# Patient Record
Sex: MALE | Race: WHITE | NOT HISPANIC OR LATINO | URBAN - METROPOLITAN AREA
[De-identification: names, ages, dates, MRNs, and addresses within clinical notes are randomized per-mention and may not be internally consistent; named-entity substitution may affect disease eponyms.]

---

## 2020-07-22 ENCOUNTER — OFFICE VISIT (OUTPATIENT)
Dept: ENDOCRINOLOGY | Facility: CLINIC | Age: 55
End: 2020-07-22

## 2020-07-22 VITALS
BODY MASS INDEX: 35.98 KG/M2 | HEART RATE: 86 BPM | OXYGEN SATURATION: 98 % | WEIGHT: 257 LBS | DIASTOLIC BLOOD PRESSURE: 62 MMHG | HEIGHT: 71 IN | SYSTOLIC BLOOD PRESSURE: 124 MMHG

## 2020-07-22 DIAGNOSIS — N52.9 ERECTILE DYSFUNCTION, UNSPECIFIED ERECTILE DYSFUNCTION TYPE: ICD-10-CM

## 2020-07-22 DIAGNOSIS — E28.0 ESTROGEN INCREASED: Primary | ICD-10-CM

## 2020-07-22 DIAGNOSIS — E11.65 TYPE 2 DIABETES MELLITUS WITH HYPERGLYCEMIA, WITHOUT LONG-TERM CURRENT USE OF INSULIN (HCC): ICD-10-CM

## 2020-07-22 LAB
ESTRADIOL SERPL HS-MCNC: 16.7 PG/ML
PROLACTIN SERPL-MCNC: 8.7 NG/ML (ref 4.04–15.2)

## 2020-07-22 PROCEDURE — 84146 ASSAY OF PROLACTIN: CPT | Performed by: INTERNAL MEDICINE

## 2020-07-22 PROCEDURE — 82670 ASSAY OF TOTAL ESTRADIOL: CPT | Performed by: INTERNAL MEDICINE

## 2020-07-22 PROCEDURE — 99244 OFF/OP CNSLTJ NEW/EST MOD 40: CPT | Performed by: INTERNAL MEDICINE

## 2020-07-22 RX ORDER — VARDENAFIL HYDROCHLORIDE 20 MG/1
20 TABLET ORAL AS NEEDED
COMMUNITY
End: 2020-12-01

## 2020-07-22 RX ORDER — SUCRALFATE 1 G/1
1 TABLET ORAL 2 TIMES DAILY
COMMUNITY
End: 2020-12-01

## 2020-07-22 RX ORDER — ROSUVASTATIN CALCIUM 10 MG/1
10 TABLET, COATED ORAL DAILY
COMMUNITY
End: 2021-03-10

## 2020-07-22 RX ORDER — ESOMEPRAZOLE MAGNESIUM 20 MG/1
1 TABLET, DELAYED RELEASE ORAL DAILY
COMMUNITY
End: 2020-12-01

## 2020-07-22 NOTE — PROGRESS NOTES
Chief Complaint   Patient presents with   • Establish Care   • Elevated Estrogen   • Decreased Libido        New patient who is being seen in consultation regarding Elevated estrogen, decreased libido  at the request of Jose Freeman MD     HPI   Tiago Whatley is a 55 y.o. male who presents to the office for evaluation of elevated estrogen, decreased libido.    Patient reports that he presented to his PCP for evaluation primarily of erectile dysfunction which had become more prominent over the past 6 months to 1 year.  He reports difficulty obtaining an erection.  He reports intermittent morning erections.  He reports that he was prescribed Levitra for this with good effect and with medication he obtains erection satisfactory for intercourse.  He denies any decrease in libido.  He reports having several labs completed with his PCP, most recently total estrogens noted to be elevated.  Other laboratory evaluation was unremarkable.  Patient denies any recent illness.  His only recent medication change was the addition of Jardiance due to suboptimal diabetes control, most recent A1c 8.2, improved from greater than 10 prior.  Patient denies any nipple discharge or breast pain.  Patient denies any testicular changes or discomfort.    Past Medical History:   Diagnosis Date   • Cellulitis    • Diverticulitis    • Hyperlipidemia    • Pancreatitis    • Skin cancer    • Type 2 diabetes mellitus (CMS/[facility])      Past Surgical History:   Procedure Laterality Date   • CARDIAC CATHETERIZATION     • COLONOSCOPY        Family History   Problem Relation Age of Onset   • Cancer Mother    • Diabetes Mother    • Heart attack Mother    • Hyperlipidemia Mother    • Kidney failure Mother    • Cancer Father       Social History     Socioeconomic History   • Marital status:      Spouse name: Not on file   • Number of children: Not on file   • Years of education: Not on file   • Highest education level: Not on file   Tobacco Use   •  "Smoking status: Never Smoker   • Smokeless tobacco: Never Used   Substance and Sexual Activity   • Alcohol use: Not Currently   • Drug use: Never   • Sexual activity: Defer      No Known Allergies   Current Outpatient Medications on File Prior to Visit   Medication Sig Dispense Refill   • Empagliflozin (Jardiance) 25 MG tablet Take 1 tablet by mouth Daily.     • Ergocalciferol (VITAMIN D2 PO) Take 1 tablet by mouth 1 (One) Time Per Week.     • Esomeprazole Magnesium 20 MG tablet delayed-release Take 1 tablet by mouth Daily.     • metFORMIN (GLUCOPHAGE) 500 MG tablet Take 500 mg by mouth 2 (Two) Times a Day With Meals.     • rosuvastatin (CRESTOR) 10 MG tablet Take 10 mg by mouth Daily.     • SITagliptin (JANUVIA) 100 MG tablet Take 100 mg by mouth Daily.     • sucralfate (CARAFATE) 1 g tablet Take 1 g by mouth 2 (two) times a day.     • vardenafil (LEVITRA) 20 MG tablet Take 20 mg by mouth As Needed for Erectile Dysfunction.       No current facility-administered medications on file prior to visit.         Review of Systems   Constitutional: Positive for fatigue. Negative for unexpected weight gain.   Cardiovascular: Positive for chest pain (none current, recently diagnosed with stomach issues). Negative for palpitations.   Gastrointestinal: Positive for abdominal pain. Negative for constipation and diarrhea.   Endocrine: Positive for polyuria. Negative for cold intolerance and heat intolerance.   Musculoskeletal: Negative for arthralgias and myalgias.   Skin: Negative for dry skin and rash.   Neurological: Negative for tremors and headache.   Psychiatric/Behavioral: Positive for sleep disturbance. The patient is nervous/anxious.         Vitals:    07/22/20 0951   BP: 124/62   Pulse: 86   SpO2: 98%   Weight: 117 kg (257 lb)   Height: 180.3 cm (71\")   Body mass index is 35.84 kg/m².     Physical Exam   Constitutional: Vital signs are normal. He appears well-developed and well-nourished. He is cooperative. He is " obese.  HENT:   Head: Normocephalic and atraumatic.   Right Ear: Hearing normal.   Left Ear: Hearing normal.   Nose: Nose normal.   Eyes: Pupils are equal, round, and reactive to light. Conjunctivae and EOM are normal.   Neck: Trachea normal. No thyromegaly present.   Cardiovascular: Normal rate and regular rhythm.   No murmur heard.  Pulmonary/Chest: Effort normal and breath sounds normal. No respiratory distress.   Abdominal: Soft. Bowel sounds are normal. He exhibits no distension. There is no hepatosplenomegaly. There is no tenderness.   Lymphadenopathy:        Head (right side): No submandibular adenopathy present.        Head (left side): No submandibular adenopathy present.     He has no cervical adenopathy.   Neurological: He is alert. He has normal strength and normal reflexes.   Skin: Skin is warm, dry and intact. No rash noted.   Psychiatric: He has a normal mood and affect. His behavior is normal.   Vitals reviewed.    Labs/Imaging  Labs dated 6/3/2020  Alkaline phosphatase 66, reference range 39-1 17  AST 17, reference range 0-40  ALT 25, reference range 0-44  EGFR 101  Total estrogens 170, reference range   Hemoglobin A1c 8.2%  Triglycerides 233  LDL 37  Urine microalbumin to creatinine ratio 10  TSH 2.61  Free testosterone 10.5, reference range 7.2-24    Assessment and Plan    Tiago was seen today for establish care, elevated estrogen and decreased libido.    Diagnoses and all orders for this visit:    Estrogen increased  - discussed potential etiologies including medications, stress, weight gain, liver dysfunction, thyroid dysfunction, hyperprolactinemia, hypogonadism, acute illness   -Patient any medications which would be expected to raise estrogen, prior TSH, LFTs, testosterone levels normal.  Patient denies recent illness.  -We will plan to repeat estradiol today and obtain prolactin level  -We will determine next steps based on labs  -Discussed with patient most likely etiology of his  elevated estrogen is aromatization of testosterone in peripheral tissues secondary to obesity  -     Estradiol  -     Prolactin    Erectile dysfunction, unspecified erectile dysfunction type  -Patient reports this is adequately managed with Levitra.    Type 2 diabetes mellitus with hyperglycemia, without long-term current use of insulin (CMS/[facility])  -Discussed long-term consequences of uncontrolled diabetes, specifically effects on vasculature and nerves which could be contributing to patient's concerns regarding erectile function.  -Jardiance recently added to patient's medication list, most recent A1c improved, discussed goal A1c 7  -Could consider exchanging Januvia for GLP-1 receptor agonist given weight loss benefits.         Return in about 4 months (around 11/22/2020) for Next scheduled follow up. The patient was instructed to contact the clinic with any interval questions or concerns.    Jo-Ann Perez MD     Please note that portions of this document were completed using a voice recognition program. Efforts were made to edit the dictations, but occasionally words are mis-transcribed.

## 2020-12-01 ENCOUNTER — LAB (OUTPATIENT)
Dept: LAB | Facility: HOSPITAL | Age: 55
End: 2020-12-01

## 2020-12-01 ENCOUNTER — OFFICE VISIT (OUTPATIENT)
Dept: ENDOCRINOLOGY | Facility: CLINIC | Age: 55
End: 2020-12-01

## 2020-12-01 VITALS
HEART RATE: 89 BPM | OXYGEN SATURATION: 99 % | HEIGHT: 71 IN | WEIGHT: 265.8 LBS | BODY MASS INDEX: 37.21 KG/M2 | DIASTOLIC BLOOD PRESSURE: 68 MMHG | SYSTOLIC BLOOD PRESSURE: 144 MMHG

## 2020-12-01 DIAGNOSIS — E11.65 TYPE 2 DIABETES MELLITUS WITH HYPERGLYCEMIA, WITHOUT LONG-TERM CURRENT USE OF INSULIN (HCC): ICD-10-CM

## 2020-12-01 DIAGNOSIS — E28.0 ESTROGEN INCREASED: Primary | ICD-10-CM

## 2020-12-01 DIAGNOSIS — N52.9 ERECTILE DYSFUNCTION, UNSPECIFIED ERECTILE DYSFUNCTION TYPE: ICD-10-CM

## 2020-12-01 LAB
ESTRADIOL SERPL HS-MCNC: 7.1 PG/ML
HBA1C MFR BLD: 6.95 % (ref 4.8–5.6)

## 2020-12-01 PROCEDURE — 82670 ASSAY OF TOTAL ESTRADIOL: CPT | Performed by: INTERNAL MEDICINE

## 2020-12-01 PROCEDURE — 83036 HEMOGLOBIN GLYCOSYLATED A1C: CPT

## 2020-12-01 PROCEDURE — 99213 OFFICE O/P EST LOW 20 MIN: CPT | Performed by: INTERNAL MEDICINE

## 2020-12-01 NOTE — PROGRESS NOTES
"Chief Complaint   Patient presents with   • Follow-up   • Estrogen Increased        HPI   Tiago Whatley is a 55 y.o. male had concerns including Follow-up and Estrogen Increased.      Patient presents for follow-up.  Of note, estradiol level as well as prolactin were normal following last visit.  Patient reports he kept appointment due to concerns for increased breast tissue since last visit.  Of note, he also reports that he has gained weight since his last visit.  Per records, he has gained approximately 8 pounds.  He reports that he remains active in his daily activities but does not have dedicated exercise.  He does report that he has been drinking more soda and has not been mindful of his diet.  He has not had repeat evaluation of his diabetes in the interim since his last visit.  He reports fasting blood sugars generally 140-180 at home.  He does report that he changed his Metformin to taking 2 tablets in the morning secondary to GI upset.  He also continues on Jardiance and Januvia.  Patient reports ongoing erectile dysfunction.  He reports he has not used Levitra recently which is why it was removed from his medication list but he did have success with this medication in the past.    The following portions of the patient's history were reviewed and updated as appropriate: allergies, current medications and past social history.    Review of Systems   Constitutional: Positive for unexpected weight gain.   Respiratory: Negative for cough and shortness of breath.    Cardiovascular: Negative for chest pain and palpitations.   Gastrointestinal: Negative for constipation, diarrhea and nausea.   Genitourinary: Positive for erectile dysfunction.       /68   Pulse 89   Ht 180.3 cm (71\")   Wt 121 kg (265 lb 12.8 oz)   SpO2 99%   BMI 37.07 kg/m²      Physical Exam      Constitutional:  well developed; well nourished  no acute distress  obese - Body mass index is 37.07 kg/m².   ENT/Thyroid: not examined "   Eyes: Conjunctiva: clear   Respiratory:  breathing is unlabored  clear to auscultation bilaterally   Cardiovascular:  regular rate and rhythm   Chest:  Not performed.   Abdomen: soft, non-tender; no masses   : Not performed.   Musculoskeletal: Not performed   Skin: dry and warm   Neuro: mental status, speech normal   Psych: mood and affect are within normal limits       Labs/Imaging  Results for LYNNE NEWBERRY (MRN [id number]) as of 12/1/2020 09:12   Ref. Range 7/22/2020 10:40   Prolactin Latest Ref Range: 4.04 - 15.20 ng/mL 8.70   Estradiol Latest Units: pg/mL 16.7       Diagnoses and all orders for this visit:    1. Estrogen increased (Primary)  -Repeat estradiol level was normal following last visit  -Thyroid function, liver function, testosterone, prolactin were all previously normal  -Patient reports concern for increased volume of breast tissue development and desires repeat testing.  Ordered.  -Discussed possibility of pseudogynecomastia secondary to weight gain.  Also reviewed aromatization of testosterone to estrogen in peripheral tissues given obesity.  -     Estradiol    2. Type 2 diabetes mellitus with hyperglycemia, without long-term current use of insulin (CMS/[facility])  -Most recent available hemoglobin A1c 8.2%  -Patient currently taking Metformin 1000 mg once daily in the morning, Januvia 100 mg, Jardiance 25 mg daily  -Reviewed medications which may potentially aid with improving glycemic control as well as weight loss.  Specifically discussed exchanging Januvia for a GLP-1 receptor agonist.  Patient reports he would prefer to avoid daily injections but would be open to taking it once weekly injection if covered by insurance.  Patient denies any history of pancreatitis, personal or family history of medullary thyroid cancer.  Reviewed potential side effects of nausea, vomiting, diarrhea.  -Repeat hemoglobin A1c today, will likely exchange Januvia for Ozempic or formulary equivalent  -Reviewed role  of dietary changes and exercise in improving glycemic control  -     Hemoglobin A1c; Future    3. Erectile dysfunction, unspecified erectile dysfunction type  -Patient reports this previously responded well to Levitra  -Discussed patient potential role of uncontrolled diabetes in causing/worsening errectile dysfunction, considering medication change to improve glycemic control, as above.      Return in about 3 months (around 3/1/2021). The patient was instructed to contact the clinic with any interval questions or concerns.    Jo-Ann Perez MD   Endocrinologist    Please note that portions of this document were completed with a voice recognition program. Efforts were made to edit the dictations, but occasionally words are mis-transcribed.

## 2021-03-10 ENCOUNTER — OFFICE VISIT (OUTPATIENT)
Dept: ENDOCRINOLOGY | Facility: CLINIC | Age: 56
End: 2021-03-10

## 2021-03-10 ENCOUNTER — LAB (OUTPATIENT)
Dept: LAB | Facility: HOSPITAL | Age: 56
End: 2021-03-10

## 2021-03-10 VITALS
WEIGHT: 264.6 LBS | BODY MASS INDEX: 37.04 KG/M2 | DIASTOLIC BLOOD PRESSURE: 72 MMHG | HEIGHT: 71 IN | HEART RATE: 91 BPM | SYSTOLIC BLOOD PRESSURE: 144 MMHG | OXYGEN SATURATION: 97 %

## 2021-03-10 DIAGNOSIS — E11.65 TYPE 2 DIABETES MELLITUS WITH HYPERGLYCEMIA, WITHOUT LONG-TERM CURRENT USE OF INSULIN (HCC): ICD-10-CM

## 2021-03-10 DIAGNOSIS — N62 PSEUDOGYNECOMASTIA: Primary | ICD-10-CM

## 2021-03-10 DIAGNOSIS — R63.5 WEIGHT GAIN: ICD-10-CM

## 2021-03-10 PROCEDURE — 99213 OFFICE O/P EST LOW 20 MIN: CPT | Performed by: INTERNAL MEDICINE

## 2021-03-10 PROCEDURE — 83036 HEMOGLOBIN GLYCOSYLATED A1C: CPT | Performed by: INTERNAL MEDICINE

## 2021-03-10 PROCEDURE — 84439 ASSAY OF FREE THYROXINE: CPT | Performed by: INTERNAL MEDICINE

## 2021-03-10 PROCEDURE — 84443 ASSAY THYROID STIM HORMONE: CPT | Performed by: INTERNAL MEDICINE

## 2021-03-10 RX ORDER — ASPIRIN 81 MG/1
1 TABLET, COATED ORAL DAILY
COMMUNITY
Start: 2021-02-01

## 2021-03-10 RX ORDER — ROSUVASTATIN CALCIUM 20 MG/1
20 TABLET, COATED ORAL DAILY
COMMUNITY
End: 2022-03-29

## 2021-03-10 NOTE — PROGRESS NOTES
"Chief Complaint   Patient presents with   • Follow-up   • Estrogen Increased        HPI   Tiago Whatley is a 55 y.o. male had concerns including Follow-up and Estrogen Increased.      Patient reports that he developed double vision in the interim since last visit.  He has had extensive evaluation and is awaiting an appointment with neuro-ophthalmology to program the staples.  He denies other significant health changes.    Reviewed labs that were obtained following last.  Repeat estrogen level was normal.  And hemoglobin A1c was at goal.    Patient reports that he believes breast tissue development is unchanged.  He denies any breast tenderness.  He does report a trend of weight gain.  Patient also reports continued erectile dysfunction which is unchanged.  He does use Levitra with good result but reports this medication is expensive.    For diabetes, patient continues on Januvia 100 mg daily, Metformin 1000 mg daily, Jardiance 25 mg daily.  He denies any stomach upset with medications.  No urinary tract or yeast infections.  Patient continues on rosuvastatin for hyperlipidemia, increased during recent hospitalization.    Patient reports concern for thyroid dysfunction.  He has had weight gain, as above.  He also reports that he is concerned this may be affecting his vision.  He denies any prior screening of thyroid function.    The following portions of the patient's history were reviewed and updated as appropriate: allergies, current medications and past social history.    Review of Systems   Constitutional: Positive for unexpected weight gain. Negative for fatigue.   Gastrointestinal: Negative for constipation, diarrhea and vomiting.   Genitourinary: Positive for erectile dysfunction. Negative for breast discharge.       /72   Pulse 91   Ht 180.3 cm (71\")   Wt 120 kg (264 lb 9.6 oz)   SpO2 97%   BMI 36.90 kg/m²      Physical Exam      Constitutional:  well developed; well nourished  no acute " distress  appears stated age  obese - Body mass index is 36.9 kg/m².   ENT/Thyroid: not examined   Eyes: EOM intact  Conjunctiva: clear   Respiratory:  Not performed.  breathing is unlabored   Cardiovascular:  regular rate and rhythm   Chest:  Not performed.   Abdomen: soft, non-tender; no masses   : Not performed.   Musculoskeletal: Not performed   Skin: dry and warm   Neuro: mental status, speech normal   Psych: mood and affect are within normal limits       Labs/Imaging  Results for LYNNE NEWBERRY (MRN [id number]) as of 3/10/2021 12:49   Ref. Range 7/22/2020 10:40 12/1/2020 09:07   Prolactin Latest Ref Range: 4.04 - 15.20 ng/mL 8.70    Hemoglobin A1C Latest Ref Range: 4.80 - 5.60 %  6.95 (H)   Estradiol Latest Units: pg/mL 16.7 7.1       Diagnoses and all orders for this visit:    1. Pseudogynecomastia (Primary)  Patient initially noted to have elevated estrogen level.  Repeat values on 2 occasions have been normal.  thyroid function, liver function, testosterone, prolactin all normal on previous evaluation  -Patient reports this is stable  -Reviewed role of weight gain and worsening pseudogynecomastia.  -Patient will contact the office with any concerning changes    2. Type 2 diabetes mellitus with hyperglycemia, without long-term current use of insulin (CMS/[facility])  -Currently taking Januvia 100 mg daily, Jardiance 25 mg daily and Metformin 1000 mg daily  -Most recent hemoglobin A1c was well controlled, will repeat today  -Previously discussed changing Januvia to GLP-1 receptor agonist given potential weight loss benefit, will defer unless repeat hemoglobin A1c above goal  -     Hemoglobin A1c    3. Weight gain  -We will check thyroid function  -     TSH  -     T4, Free         Return in about 6 months (around 9/10/2021). The patient was instructed to contact the clinic with any interval questions or concerns.    Jo-Ann Perez MD   Endocrinologist    Please note that portions of this document were completed  with a voice recognition program. Efforts were made to edit the dictations, but occasionally words are mis-transcribed.

## 2021-03-11 LAB
HBA1C MFR BLD: 7.19 % (ref 4.8–5.6)
T4 FREE SERPL-MCNC: 1.26 NG/DL (ref 0.93–1.7)
TSH SERPL DL<=0.05 MIU/L-ACNC: 1.6 UIU/ML (ref 0.27–4.2)

## 2021-09-10 ENCOUNTER — OFFICE VISIT (OUTPATIENT)
Dept: ENDOCRINOLOGY | Facility: CLINIC | Age: 56
End: 2021-09-10

## 2021-09-10 VITALS
WEIGHT: 252 LBS | HEART RATE: 84 BPM | BODY MASS INDEX: 35.28 KG/M2 | DIASTOLIC BLOOD PRESSURE: 74 MMHG | HEIGHT: 71 IN | SYSTOLIC BLOOD PRESSURE: 134 MMHG | OXYGEN SATURATION: 97 %

## 2021-09-10 DIAGNOSIS — E11.65 TYPE 2 DIABETES MELLITUS WITH HYPERGLYCEMIA, WITHOUT LONG-TERM CURRENT USE OF INSULIN (HCC): Primary | ICD-10-CM

## 2021-09-10 DIAGNOSIS — N62 PSEUDOGYNECOMASTIA: ICD-10-CM

## 2021-09-10 DIAGNOSIS — E78.5 HYPERLIPIDEMIA, UNSPECIFIED HYPERLIPIDEMIA TYPE: ICD-10-CM

## 2021-09-10 LAB
EXPIRATION DATE: ABNORMAL
GLUCOSE BLDC GLUCOMTR-MCNC: 190 MG/DL (ref 70–130)
Lab: ABNORMAL

## 2021-09-10 PROCEDURE — 99214 OFFICE O/P EST MOD 30 MIN: CPT | Performed by: INTERNAL MEDICINE

## 2021-09-10 PROCEDURE — 82947 ASSAY GLUCOSE BLOOD QUANT: CPT | Performed by: INTERNAL MEDICINE

## 2021-09-10 NOTE — PROGRESS NOTES
"Chief Complaint   Patient presents with   • Diabetes        HPI   Tiago Whatley is a 56 y.o. male had concerns including Diabetes.        Patient denies significant health changes in the interim since last visit.  He reports that he recently saw his PCP and had repeat labs during an acute visit for a tick bite.    Regarding his diabetes, patient is currently taking Januvia 100 mg daily, Jardiance 25 mg daily, Metformin 1000 mg daily.  Patient has lost 12 pounds in the interim since last visit.  He attributes this to increased activity level as he has been working a lot as well as dietary changes.  He denies any issues with his current medications, specifically no urinary tract or yeast infections, no nausea vomiting or GI upset.    Patient continues on rosuvastatin 20 mg daily for hyperlipidemia.  He denies myalgias or abdominal pain.    Patient reports that he has not noticed any changes in breast tissue in the interim since last visit.  No pain or discharge.    The following portions of the patient's history were reviewed and updated as appropriate: allergies, current medications and past social history.    Review of Systems   Gastrointestinal: Negative for abdominal pain, nausea and vomiting.   Endocrine: Negative for polydipsia and polyuria.   Genitourinary: Negative for breast discharge.   Musculoskeletal: Negative for myalgias.        /74 (BP Location: Right arm, Patient Position: Sitting, Cuff Size: Adult)   Pulse 84   Ht 180.3 cm (71\")   Wt 114 kg (252 lb)   SpO2 97%   BMI 35.15 kg/m²      Physical Exam  Cardiovascular:      Pulses:           Dorsalis pedis pulses are 2+ on the right side and 2+ on the left side.        Posterior tibial pulses are 2+ on the right side and 2+ on the left side.   Feet:      Right foot:      Protective Sensation: 10 sites tested. 10 sites sensed.      Skin integrity: Skin integrity normal.      Left foot:      Protective Sensation: 10 sites tested. 10 sites sensed. "      Skin integrity: Skin integrity normal.           Constitutional:  well developed; well nourished  no acute distress  obese - Body mass index is 35.15 kg/m².   ENT/Thyroid: not examined   Eyes: Conjunctiva: clear   Respiratory:  breathing is unlabored  clear to auscultation bilaterally   Cardiovascular:  regular rate and rhythm   Chest:  Not performed.   Abdomen: soft, non-tender; no masses   : Not performed.   Musculoskeletal: Not performed   Skin: dry and warm   Neuro: mental status, speech normal   Psych: mood and affect are within normal limits     Diabetic Foot Exam Performed and Monofilament Test Performed    Labs/Imaging  Labs dated 8/5/2021  Hemoglobin A1c 7%  eGFR 106  AST 26  ALT 28  Alkaline phosphatase 79  TSH 2.12  Triglycerides 267  LDL 41  Results for LYNNE NEWBERRY (MRN [id number]) as of 9/10/2021 11:25   Ref. Range 9/10/2021 09:27   Glucose Latest Ref Range: 70 - 130 mg/dL 190 (A)       Diagnoses and all orders for this visit:    1. Type 2 diabetes mellitus with hyperglycemia, without long-term current use of insulin (CMS/[facility]) (Primary)  -Uncontrolled based on recent hemoglobin A1c 7% from August 2021  -No home glycemic data available for review  -Patient denies any side effects with current medication  -Continue Januvia 100 mg  -Continue Metformin 1000 mg day  -Continue Jardiance 25 mg daily  -Reviewed the role of weight loss and dietary changes in improving serum glucose  Patient was advised to monitor blood sugar a few times per week.  Patient was instructed to bring glucometer to all future appointments. Patient should contact the clinic between appointments with hypoglycemia or persistent hyperglycemia.  Discussed signs and symptoms of hypoglycemia as well as hypoglycemia management via the rule of 15's.  Discussed potential for long-term complications with uncontrolled diabetes including nephropathy, neuropathy, retinopathy, increased risk for cardiac disease.  Discussed the role of  diet and exercise in the management of diabetes.  Monofilament exam completed today  CMP up-to-date from August 2021, EGFR 106, LFTs normal  Lipid panel up-to-date from August 2021, triglycerides 267, LDL 41, taking rosuvastatin  -     POC Glucose, Blood    2. Hyperlipidemia, unspecified hyperlipidemia type  -LDL at goal, triglycerides mildly elevated on labs from August.  Of note, these were not fasting.  -Continue rosuvastatin 20 mg daily     3. Pseudogynecomastia  Patient initially noted to have elevated estrogen level.  Repeat values on 2 occasions have been normal. thyroid function, liver function, testosterone, prolactin all normal on previous evaluation  -Patient reports no changes in the interim since last visit  -Reviewed that gynecomastia may improve with weight loss       Return in about 6 months (around 3/10/2022). The patient was instructed to contact the clinic with any interval questions or concerns.    Jo-Ann Perez MD   Endocrinologist    Please note that portions of this document were completed with a voice recognition program. Efforts were made to edit the dictations, but occasionally words are mis-transcribed.

## 2022-03-29 ENCOUNTER — TELEPHONE (OUTPATIENT)
Dept: ENDOCRINOLOGY | Facility: CLINIC | Age: 57
End: 2022-03-29

## 2022-03-29 ENCOUNTER — OFFICE VISIT (OUTPATIENT)
Dept: ENDOCRINOLOGY | Facility: CLINIC | Age: 57
End: 2022-03-29

## 2022-03-29 VITALS
DIASTOLIC BLOOD PRESSURE: 68 MMHG | HEART RATE: 94 BPM | SYSTOLIC BLOOD PRESSURE: 126 MMHG | BODY MASS INDEX: 37.52 KG/M2 | WEIGHT: 268 LBS | OXYGEN SATURATION: 96 % | HEIGHT: 71 IN

## 2022-03-29 DIAGNOSIS — E78.5 HYPERLIPIDEMIA, UNSPECIFIED HYPERLIPIDEMIA TYPE: ICD-10-CM

## 2022-03-29 DIAGNOSIS — E11.65 TYPE 2 DIABETES MELLITUS WITH HYPERGLYCEMIA, WITHOUT LONG-TERM CURRENT USE OF INSULIN: Primary | ICD-10-CM

## 2022-03-29 LAB
EXPIRATION DATE: ABNORMAL
EXPIRATION DATE: NORMAL
GLUCOSE BLDC GLUCOMTR-MCNC: 206 MG/DL (ref 70–130)
HBA1C MFR BLD: 6.7 %
Lab: ABNORMAL
Lab: NORMAL

## 2022-03-29 PROCEDURE — 83036 HEMOGLOBIN GLYCOSYLATED A1C: CPT | Performed by: INTERNAL MEDICINE

## 2022-03-29 PROCEDURE — 3044F HG A1C LEVEL LT 7.0%: CPT | Performed by: INTERNAL MEDICINE

## 2022-03-29 PROCEDURE — 99213 OFFICE O/P EST LOW 20 MIN: CPT | Performed by: INTERNAL MEDICINE

## 2022-03-29 PROCEDURE — 82947 ASSAY GLUCOSE BLOOD QUANT: CPT | Performed by: INTERNAL MEDICINE

## 2022-03-29 RX ORDER — ATORVASTATIN CALCIUM 10 MG/1
10 TABLET, FILM COATED ORAL DAILY
COMMUNITY
Start: 2022-02-17

## 2022-03-29 RX ORDER — TESTOSTERONE CYPIONATE 200 MG/ML
400 INJECTION, SOLUTION INTRAMUSCULAR
COMMUNITY
Start: 2022-03-23

## 2022-03-29 NOTE — TELEPHONE ENCOUNTER
----- Message from Jo-Ann Perez MD sent at 3/29/2022  9:50 AM EDT -----  Request recent screening labs, specifically urine microalbumin, renal function testing, lipid panel from PCP

## 2022-03-29 NOTE — PROGRESS NOTES
"Chief Complaint   Patient presents with   • Diabetes   • Hyperlipidemia        HPI   Tiago Whatley is a 56 y.o. male had concerns including Diabetes and Hyperlipidemia.      Patient reports that PCP changed his rosuvastatin to atorvastatin in the interim since last visit due to concern for muscle cramping.  He reports this is improved after transition to atorvastatin.  He also reports that he saw a urologist in [place] and was started on testosterone since last visit.  He reports energy has improved since starting this.    Current diabetes medications include the following: Januvia 100 mg daily, Jardiance 25 mg daily, Metformin 1000 mg daily.  Patient denies any GI upset with medications, no urinary tract or yeast infections.  He is monitoring blood glucose rarely, denies increased thirst or increased urination.    The following portions of the patient's history were reviewed and updated as appropriate: allergies, current medications and past social history.    Review of Systems   Gastrointestinal: Negative for abdominal pain, nausea and vomiting.   Endocrine: Negative for polydipsia and polyuria.   Musculoskeletal: Negative for myalgias.        /68 (BP Location: Left arm, Patient Position: Sitting, Cuff Size: Adult)   Pulse 94   Ht 180.3 cm (71\")   Wt 122 kg (268 lb)   SpO2 96%   BMI 37.38 kg/m²      Physical Exam      Constitutional:  well developed; well nourished  no acute distress  obese - Body mass index is 37.38 kg/m².   ENT/Thyroid: not examined   Eyes: Conjunctiva: clear   Respiratory:  breathing is unlabored  clear to auscultation bilaterally   Cardiovascular:  regular rate and rhythm   Chest:  Not performed.   Abdomen: soft, non-tender; no masses   : Not performed.   Musculoskeletal: Not performed   Skin: not performed.   Neuro: mental status, speech normal   Psych: mood and affect are within normal limits       Labs/Imaging   Latest Reference Range & Units 03/29/22 09:31 03/29/22 09:32 "   Glucose 70 - 130 mg/dL 206 !    Hemoglobin A1C %  6.7   !: Data is abnormal    Diagnoses and all orders for this visit:    1. Type 2 diabetes mellitus with hyperglycemia, without long-term current use of insulin (HCC) (Primary)  Well-controlled with A1c 6.7%, patient denies any notable adverse effects of medication.  Hyperglycemic during office visit.  We did discuss interval weight gain since last visit, discussed correlation between insulin resistance and weight gain.  Reviewed that insulin resistance makes diabetes more difficult to control.  Continue Jardiance 25 mg daily, Metformin 1000 mg daily (this is patient's maximum tolerated dose of this medication) and Januvia 100 mg daily  Patient was advised to monitor blood sugar daily.  Patient was instructed to bring glucometer to all future appointments. Patient should contact the clinic between appointments with hypoglycemia or persistent hyperglycemia.  Discussed signs and symptoms of hypoglycemia as well as hypoglycemia management via the rule of 15's.  Discussed potential for long-term complications with uncontrolled diabetes including nephropathy, neuropathy, retinopathy, increased risk for cardiac disease.  Discussed the role of diet and exercise in the management of diabetes.  Monofilament exam completed September 2021  CMP, lipids up-to-date from August 2021  Eye exam up to date from 2021 per patient report  Patient reports urine microalbumin recently completed by PCP, requesting labs  -     POC Glucose, Blood  -     POC Glycosylated Hemoglobin (Hb A1C)    2. Hyperlipidemia, unspecified hyperlipidemia type  Lipid panel up-to-date from August 2021, continue atorvastatin 10 mg daily.  Patient reports that he developed myalgias while taking rosuvastatin.  Denies current symptoms.    Addendum  Received copy of outside labs dated 2/16/2022    Calcium 9.5  Alkaline phosphatase 98  ALT 23  AST 24  Hemoglobin A1c 7  Triglycerides 342  LDL 46  Microalbumin  to creatinine ratio 20.2  TSH 2.24      Return in about 6 months (around 9/29/2022) for Next scheduled follow up. The patient was instructed to contact the clinic with any interval questions or concerns.    Jo-Ann Perez MD   Endocrinologist    Dictated Utilizing Dragon Dictation

## 2022-09-20 ENCOUNTER — OFFICE VISIT (OUTPATIENT)
Dept: ENDOCRINOLOGY | Facility: CLINIC | Age: 57
End: 2022-09-20

## 2022-09-20 VITALS
DIASTOLIC BLOOD PRESSURE: 92 MMHG | SYSTOLIC BLOOD PRESSURE: 164 MMHG | BODY MASS INDEX: 35.98 KG/M2 | WEIGHT: 257 LBS | HEIGHT: 71 IN | OXYGEN SATURATION: 95 % | HEART RATE: 80 BPM

## 2022-09-20 DIAGNOSIS — E78.5 HYPERLIPIDEMIA, UNSPECIFIED HYPERLIPIDEMIA TYPE: ICD-10-CM

## 2022-09-20 DIAGNOSIS — E11.65 TYPE 2 DIABETES MELLITUS WITH HYPERGLYCEMIA, WITHOUT LONG-TERM CURRENT USE OF INSULIN: Primary | ICD-10-CM

## 2022-09-20 LAB
EXPIRATION DATE: ABNORMAL
EXPIRATION DATE: NORMAL
GLUCOSE BLDC GLUCOMTR-MCNC: 154 MG/DL (ref 70–130)
HBA1C MFR BLD: 6.2 %
Lab: ABNORMAL
Lab: NORMAL

## 2022-09-20 PROCEDURE — 83036 HEMOGLOBIN GLYCOSYLATED A1C: CPT | Performed by: INTERNAL MEDICINE

## 2022-09-20 PROCEDURE — 3044F HG A1C LEVEL LT 7.0%: CPT | Performed by: INTERNAL MEDICINE

## 2022-09-20 PROCEDURE — 99214 OFFICE O/P EST MOD 30 MIN: CPT | Performed by: INTERNAL MEDICINE

## 2022-09-20 PROCEDURE — 82947 ASSAY GLUCOSE BLOOD QUANT: CPT | Performed by: INTERNAL MEDICINE

## 2022-09-20 RX ORDER — DICLOFENAC SODIUM 75 MG/1
TABLET, DELAYED RELEASE ORAL
COMMUNITY
Start: 2022-08-18

## 2022-09-20 NOTE — PROGRESS NOTES
"Chief Complaint   Patient presents with   • Diabetes        HPI   Tiago Whatley is a 57 y.o. male had concerns including Diabetes.      Patient reports no significant health changes in the interim since his last visit.  He has lost approximately 11 pounds.  He reports that he feels well.  He has not been monitoring glucose routinely at home.    Current diabetes medications include the following:  Jardiance 25 mg daily, metformin 1000 mg daily, Januvia 100 mg daily     Patient continues on atorvastatin 10 mg daily.  He reports intermittent arthralgias, no myalgias.    The following portions of the patient's history were reviewed and updated as appropriate: allergies, current medications and past social history.    Review of Systems   Constitutional: Negative for unexpected weight gain and unexpected weight loss.   Gastrointestinal: Negative for constipation and diarrhea.   Musculoskeletal: Positive for arthralgias. Negative for myalgias.      /92 (BP Location: Left arm, Patient Position: Sitting, Cuff Size: Adult)   Pulse 80   Ht 180.3 cm (71\")   Wt 117 kg (257 lb)   SpO2 95%   BMI 35.84 kg/m²      Physical Exam      Constitutional:  well developed; well nourished  no acute distress  obese - Body mass index is 35.84 kg/m².   ENT/Thyroid: not examined   Eyes: Conjunctiva: clear   Respiratory:  breathing is unlabored  clear to auscultation bilaterally   Cardiovascular:  regular rate and rhythm   Chest:  Not performed.   Abdomen: soft, non-tender; no masses   : Not performed.   Musculoskeletal: Not performed   Skin: not performed.   Neuro: mental status, speech normal   Psych: mood and affect are within normal limits       Labs/Imaging   Latest Reference Range & Units 09/20/22 08:11   Glucose 70 - 130 mg/dL 154 !   Hemoglobin A1C % 6.2   !: Data is abnormal    Diagnoses and all orders for this visit:    1. Type 2 diabetes mellitus with hyperglycemia, without long-term current use of insulin (HCC) " (Primary)  Well-controlled with hemoglobin A1c 6.2%  Patient reports he would like to reduce medications, if able.  Discussed threshold for reduction in medications.  If repeat hemoglobin A1c next visit less than 6.5 we will likely attempt to reduce either metformin or Januvia.  Patient to continue Januvia 100 mg daily, Jardiance 25 mg daily, metformin 1000 mg daily (this is his maximum tolerated dose)  Patient was advised to monitor blood sugar daily.  Patient was instructed to bring glucometer to all future appointments. Patient should contact the clinic between appointments with hypoglycemia or persistent hyperglycemia.  Discussed signs and symptoms of hypoglycemia as well as hypoglycemia management via the rule of 15's.  Discussed potential for long-term complications with uncontrolled diabetes including nephropathy, neuropathy, retinopathy, increased risk for cardiac disease.  Discussed the role of diet and exercise in the management of diabetes.  CMP is up-to-date from February 2022, EGFR 123  Lipid panel up-to-date from February 2022, LDL 46, triglycerides 342  Urine microalbumin to creatinine ratio 20.2 in February 2022  -     POC Glucose, Blood  -     POC Glycosylated Hemoglobin (Hb A1C)    2. Hyperlipidemia, unspecified hyperlipidemia type  Lipid panel is up-to-date from February 2022, continue atorvastatin 10 mg daily      Return in about 4 months (around 1/20/2023). The patient was instructed to contact the clinic with any interval questions or concerns.    Jo-Ann Perez MD   Endocrinologist    Dictated Utilizing Dragon Dictation

## 2023-02-08 ENCOUNTER — OFFICE VISIT (OUTPATIENT)
Dept: ENDOCRINOLOGY | Facility: CLINIC | Age: 58
End: 2023-02-08
Payer: MEDICAID

## 2023-02-08 VITALS
DIASTOLIC BLOOD PRESSURE: 82 MMHG | HEART RATE: 90 BPM | BODY MASS INDEX: 36.82 KG/M2 | SYSTOLIC BLOOD PRESSURE: 144 MMHG | OXYGEN SATURATION: 96 % | WEIGHT: 263 LBS | HEIGHT: 71 IN

## 2023-02-08 DIAGNOSIS — E78.5 HYPERLIPIDEMIA, UNSPECIFIED HYPERLIPIDEMIA TYPE: ICD-10-CM

## 2023-02-08 DIAGNOSIS — E11.65 TYPE 2 DIABETES MELLITUS WITH HYPERGLYCEMIA, WITHOUT LONG-TERM CURRENT USE OF INSULIN: Primary | ICD-10-CM

## 2023-02-08 LAB
EXPIRATION DATE: ABNORMAL
EXPIRATION DATE: NORMAL
GLUCOSE BLDC GLUCOMTR-MCNC: 210 MG/DL (ref 70–130)
HBA1C MFR BLD: 8.3 %
Lab: ABNORMAL
Lab: NORMAL

## 2023-02-08 PROCEDURE — 3052F HG A1C>EQUAL 8.0%<EQUAL 9.0%: CPT | Performed by: INTERNAL MEDICINE

## 2023-02-08 PROCEDURE — 99214 OFFICE O/P EST MOD 30 MIN: CPT | Performed by: INTERNAL MEDICINE

## 2023-02-08 PROCEDURE — 83036 HEMOGLOBIN GLYCOSYLATED A1C: CPT | Performed by: INTERNAL MEDICINE

## 2023-02-08 PROCEDURE — 82947 ASSAY GLUCOSE BLOOD QUANT: CPT | Performed by: INTERNAL MEDICINE

## 2023-02-08 RX ORDER — LANCETS 30 GAUGE
EACH MISCELLANEOUS
Qty: 100 EACH | Refills: 11 | Status: SHIPPED | OUTPATIENT
Start: 2023-02-08

## 2023-02-08 RX ORDER — BLOOD-GLUCOSE METER
1 KIT MISCELLANEOUS ONCE
Qty: 1 EACH | Refills: 0 | Status: SHIPPED | OUTPATIENT
Start: 2023-02-08 | End: 2023-02-08

## 2023-02-08 NOTE — PROGRESS NOTES
"Chief Complaint   Patient presents with   • Diabetes        HPI   Tiago Whatley is a 57 y.o. male had concerns including Diabetes.      Patient reports he was diagnosed with COVID last week.  He reports he has been physically inactive since diagnosis.  Does report that he is now feeling better.  He is not monitoring glucose values at home.  Patient does admit that he has been drinking more Pepsi than previous.    Current diabetes medications with the following:  Januvia 100 mg daily  Jardiance 25 mg daily  Metformin 1000 mg daily    Patient continues on atorvastatin 10 mg daily.  He denies myalgias.    Patient reports he is due to establish care with a new PCP.  He believes he will have labs at this appointment and can bring a copy to next visit.    The following portions of the patient's history were reviewed and updated as appropriate: allergies, current medications and past social history.    Review of Systems   Constitutional: Positive for fatigue.   Gastrointestinal: Negative for abdominal pain, nausea and vomiting.   Endocrine: Negative for polydipsia and polyuria.      /82 (BP Location: Left arm, Patient Position: Sitting, Cuff Size: Adult)   Pulse 90   Ht 180.3 cm (71\")   Wt 119 kg (263 lb)   SpO2 96%   BMI 36.68 kg/m²      Physical Exam      Constitutional:  well developed; well nourished  no acute distress  obese - Body mass index is 36.68 kg/m².   ENT/Thyroid: not examined   Eyes: Conjunctiva: clear   Respiratory:  breathing is unlabored  clear to auscultation bilaterally   Cardiovascular:  regular rate and rhythm   Chest:  Not performed.   Abdomen: Not performed.   : Not performed.   Musculoskeletal: Not performed   Skin: not performed.   Neuro: mental status, speech normal   Psych: mood and affect are within normal limits       Labs/Imaging   Latest Reference Range & Units 02/08/23 13:23   Glucose 70 - 130 mg/dL 210 !   Hemoglobin A1C % 8.3   !: Data is abnormal    Diagnoses and all " orders for this visit:    1. Type 2 diabetes mellitus with hyperglycemia, without long-term current use of insulin (HCC) (Primary)  -     POC Glucose, Blood  -     POC Glycosylated Hemoglobin (Hb A1C)  -     Comprehensive Metabolic Panel; Future  -     Lipid Panel; Future  -     Microalbumin / Creatinine Urine Ratio - Urine, Clean Catch; Future  Uncontrolled with hemoglobin A1c 8.3%, worsened from previous  No home glycemic data available for review  Reviewed options for improving glycemic control.  Patient does report that he previously did not tolerate 2000 mg of metformin daily but would be willing to try 3 tablets daily.  He would prefer not to make a change to GLP-1 receptor agonist or insulin at this time.  Increase metformin to 1000 mg in the morning and 500 mg with dinner  Continue Jardiance 25 mg daily  Continue Januvia 100 mg daily  Patient does report more intake of sugary drinks compared to prior.  Discussed that reduction of sugary drinks would be beneficial in achieving optimal glycemic control.  Patient will work to reduce Pepsi intake.  Patient was advised to monitor blood sugar 1-2 times daily.  Patient was instructed to bring glucometer to all future appointments. Patient should contact the clinic between appointments with hypoglycemia or persistent hyperglycemia.  Discussed signs and symptoms of hypoglycemia as well as hypoglycemia management via the rule of 15's.  Discussed potential for long-term complications with uncontrolled diabetes including nephropathy, neuropathy, retinopathy, increased risk for cardiac disease.  Discussed the role of diet and exercise in the management of diabetes.  Update screening labs as needed after PCP appointment, patient to bring copy to next visit    2. Hyperlipidemia, unspecified hyperlipidemia type  Due for lipid panel, continue atorvastatin    Other orders  -     glucose monitor monitoring kit; 1 each 1 (One) Time for 1 dose.  Dispense: 1 each; Refill: 0  -      glucose blood test strip; Use as instructed 3 times a day  Dispense: 100 each; Refill: 11  -     Lancets misc; For use with glucose monitoring TID  Dispense: 100 each; Refill: 11      Return in about 3 months (around 5/8/2023) for Next scheduled follow up. The patient was instructed to contact the clinic with any interval questions or concerns.    Jo-Ann Perez MD   Endocrinologist    Dictated Utilizing Dragon Dictation

## 2023-05-16 ENCOUNTER — OFFICE VISIT (OUTPATIENT)
Dept: ENDOCRINOLOGY | Facility: CLINIC | Age: 58
End: 2023-05-16
Payer: MEDICAID

## 2023-05-16 VITALS
SYSTOLIC BLOOD PRESSURE: 140 MMHG | HEART RATE: 90 BPM | OXYGEN SATURATION: 94 % | HEIGHT: 71 IN | BODY MASS INDEX: 35.28 KG/M2 | WEIGHT: 252 LBS | DIASTOLIC BLOOD PRESSURE: 90 MMHG

## 2023-05-16 DIAGNOSIS — E11.65 TYPE 2 DIABETES MELLITUS WITH HYPERGLYCEMIA, WITHOUT LONG-TERM CURRENT USE OF INSULIN: Primary | ICD-10-CM

## 2023-05-16 DIAGNOSIS — E78.5 HYPERLIPIDEMIA, UNSPECIFIED HYPERLIPIDEMIA TYPE: ICD-10-CM

## 2023-05-16 DIAGNOSIS — E34.9 TESTOSTERONE DEFICIENCY: ICD-10-CM

## 2023-05-16 LAB
ALBUMIN SERPL-MCNC: 4.4 G/DL (ref 3.5–5.2)
ALBUMIN UR-MCNC: 1.4 MG/DL
ALBUMIN/GLOB SERPL: 2 G/DL
ALP SERPL-CCNC: 68 U/L (ref 39–117)
ALT SERPL W P-5'-P-CCNC: 30 U/L (ref 1–41)
ANION GAP SERPL CALCULATED.3IONS-SCNC: 9.6 MMOL/L (ref 5–15)
AST SERPL-CCNC: 20 U/L (ref 1–40)
BILIRUB SERPL-MCNC: 0.4 MG/DL (ref 0–1.2)
BUN SERPL-MCNC: 15 MG/DL (ref 6–20)
BUN/CREAT SERPL: 18.3 (ref 7–25)
CALCIUM SPEC-SCNC: 9.7 MG/DL (ref 8.6–10.5)
CHLORIDE SERPL-SCNC: 102 MMOL/L (ref 98–107)
CHOLEST SERPL-MCNC: 162 MG/DL (ref 0–200)
CO2 SERPL-SCNC: 25.4 MMOL/L (ref 22–29)
CREAT SERPL-MCNC: 0.82 MG/DL (ref 0.76–1.27)
CREAT UR-MCNC: 79.9 MG/DL
EGFRCR SERPLBLD CKD-EPI 2021: 101.2 ML/MIN/1.73
EXPIRATION DATE: ABNORMAL
EXPIRATION DATE: NORMAL
GLOBULIN UR ELPH-MCNC: 2.2 GM/DL
GLUCOSE BLDC GLUCOMTR-MCNC: 145 MG/DL (ref 70–130)
GLUCOSE SERPL-MCNC: 138 MG/DL (ref 65–99)
HBA1C MFR BLD: 7.8 %
HDLC SERPL-MCNC: 34 MG/DL (ref 40–60)
LDLC SERPL CALC-MCNC: 98 MG/DL (ref 0–100)
LDLC/HDLC SERPL: 2.76 {RATIO}
Lab: ABNORMAL
Lab: NORMAL
MICROALBUMIN/CREAT UR: 17.5 MG/G
POTASSIUM SERPL-SCNC: 4.1 MMOL/L (ref 3.5–5.2)
PROT SERPL-MCNC: 6.6 G/DL (ref 6–8.5)
SODIUM SERPL-SCNC: 137 MMOL/L (ref 136–145)
TRIGL SERPL-MCNC: 171 MG/DL (ref 0–150)
VLDLC SERPL-MCNC: 30 MG/DL (ref 5–40)

## 2023-05-16 PROCEDURE — 99214 OFFICE O/P EST MOD 30 MIN: CPT | Performed by: INTERNAL MEDICINE

## 2023-05-16 PROCEDURE — 84403 ASSAY OF TOTAL TESTOSTERONE: CPT | Performed by: INTERNAL MEDICINE

## 2023-05-16 PROCEDURE — 1160F RVW MEDS BY RX/DR IN RCRD: CPT | Performed by: INTERNAL MEDICINE

## 2023-05-16 PROCEDURE — 82043 UR ALBUMIN QUANTITATIVE: CPT | Performed by: INTERNAL MEDICINE

## 2023-05-16 PROCEDURE — 83036 HEMOGLOBIN GLYCOSYLATED A1C: CPT | Performed by: INTERNAL MEDICINE

## 2023-05-16 PROCEDURE — 84402 ASSAY OF FREE TESTOSTERONE: CPT | Performed by: INTERNAL MEDICINE

## 2023-05-16 PROCEDURE — 80053 COMPREHEN METABOLIC PANEL: CPT | Performed by: INTERNAL MEDICINE

## 2023-05-16 PROCEDURE — 1159F MED LIST DOCD IN RCRD: CPT | Performed by: INTERNAL MEDICINE

## 2023-05-16 PROCEDURE — 80061 LIPID PANEL: CPT | Performed by: INTERNAL MEDICINE

## 2023-05-16 PROCEDURE — 3051F HG A1C>EQUAL 7.0%<8.0%: CPT | Performed by: INTERNAL MEDICINE

## 2023-05-16 PROCEDURE — 82570 ASSAY OF URINE CREATININE: CPT | Performed by: INTERNAL MEDICINE

## 2023-05-16 PROCEDURE — 82947 ASSAY GLUCOSE BLOOD QUANT: CPT | Performed by: INTERNAL MEDICINE

## 2023-05-16 RX ORDER — CHLORAL HYDRATE 500 MG
CAPSULE ORAL
COMMUNITY

## 2023-05-16 RX ORDER — ALOGLIPTIN AND METFORMIN HYDROCHLORIDE 12.5; 1 MG/1; MG/1
TABLET, FILM COATED ORAL 2 TIMES DAILY
COMMUNITY

## 2023-05-16 NOTE — PROGRESS NOTES
"Chief Complaint   Patient presents with   • Diabetes        HPI   Tiago Whatley is a 57 y.o. male had concerns including Diabetes.     Metformin and Januvia transition to combination alogliptin-metformin 12. 5-1000, 2 tablets daily in the interim since last visit. He estimates this was done in February.  Patient does continue on Jardiance 25 mg daily. Patient does report that he had a steroid injection since last visit.    Patient continues on atorvastatin 10 mg daily, denies myalgias.      Patient continues on testosterone therapy which was prescribed by another provider, he request levels be checked today.    The following portions of the patient's history were reviewed and updated as appropriate: allergies, current medications and past social history.    Review of Systems   Gastrointestinal: Negative for abdominal pain, nausea and vomiting.   Musculoskeletal: Negative for myalgias.      /90 (BP Location: Right arm, Patient Position: Sitting, Cuff Size: Adult)   Pulse 90   Ht 180.3 cm (71\")   Wt 114 kg (252 lb)   SpO2 94%   BMI 35.15 kg/m²      Physical Exam      Constitutional:  well developed; well nourished  no acute distress   ENT/Thyroid: not examined   Eyes: Conjunctiva: clear   Respiratory:  clear to auscultation bilaterally   Cardiovascular:  regular rate and rhythm   Chest:  Not performed.   Abdomen: soft, non-tender; no masses   : Not performed.   Musculoskeletal: Not performed   Skin: not performed.   Neuro: mental status, speech normal   Psych: mood and affect are within normal limits       Labs/Imaging   Latest Reference Range & Units 05/16/23 08:29 05/16/23 08:30   Glucose 70 - 130 mg/dL 145 !    Hemoglobin A1C %  7.8   !: Data is abnormal    Diagnoses and all orders for this visit:    1. Type 2 diabetes mellitus with hyperglycemia, without long-term current use of insulin (Primary)  Diabetes is uncontrolled with hemoglobin A1c 7.8%, improved from previous  Patient is currently taking " Jardiance 25 mg daily, will continue  Patient to continue combination alogliptin-metformin 12 point 5-1000, 2 tablets daily.  Reviewed options for improving glycemic control. No medication changes today.  Discussed role of diet changes, exercise and improving glycemic control.  Patient was advised to monitor blood sugar 2-3 times daily.  Patient was instructed to bring glucometer to all future appointments. Patient should contact the clinic between appointments with hypoglycemia or persistent hyperglycemia.  Discussed signs and symptoms of hypoglycemia as well as hypoglycemia management via the rule of 15's.  Discussed potential for long-term complications with uncontrolled diabetes including nephropathy, neuropathy, retinopathy, increased risk for cardiac disease.  Discussed the role of diet and exercise in the management of diabetes.  Update screening labs today  -     POC Glucose, Blood  -     POC Glycosylated Hemoglobin (Hb A1C)    2. Hyperlipidemia, unspecified hyperlipidemia type  Updating lipid panel today, patient continues on atorvastatin.    Return in about 3 months (around 8/16/2023). The patient was instructed to contact the clinic with any interval questions or concerns.    Jo-Ann Perez MD   Endocrinologist    Dictated Utilizing Dragon Dictation

## 2023-05-22 LAB
TESTOST FREE MFR SERPL: 3.06 % (ref 1.5–4.2)
TESTOST FREE SERPL-MCNC: 21.7 NG/DL (ref 5–21)
TESTOST SERPL-MCNC: 709.3 NG/DL (ref 264–916)

## 2023-10-11 ENCOUNTER — OFFICE VISIT (OUTPATIENT)
Dept: ENDOCRINOLOGY | Facility: CLINIC | Age: 58
End: 2023-10-11
Payer: MEDICAID

## 2023-10-11 VITALS
HEART RATE: 96 BPM | BODY MASS INDEX: 36.12 KG/M2 | DIASTOLIC BLOOD PRESSURE: 90 MMHG | OXYGEN SATURATION: 98 % | HEIGHT: 71 IN | WEIGHT: 258 LBS | SYSTOLIC BLOOD PRESSURE: 142 MMHG

## 2023-10-11 DIAGNOSIS — E78.5 HYPERLIPIDEMIA, UNSPECIFIED HYPERLIPIDEMIA TYPE: ICD-10-CM

## 2023-10-11 DIAGNOSIS — E11.65 TYPE 2 DIABETES MELLITUS WITH HYPERGLYCEMIA, WITHOUT LONG-TERM CURRENT USE OF INSULIN: Primary | ICD-10-CM

## 2023-10-11 LAB
EXPIRATION DATE: ABNORMAL
EXPIRATION DATE: ABNORMAL
GLUCOSE BLDC GLUCOMTR-MCNC: 145 MG/DL (ref 70–130)
HBA1C MFR BLD: 6.6 % (ref 4.5–5.7)
Lab: ABNORMAL
Lab: ABNORMAL

## 2023-10-11 PROCEDURE — 99214 OFFICE O/P EST MOD 30 MIN: CPT | Performed by: INTERNAL MEDICINE

## 2023-10-11 PROCEDURE — 3044F HG A1C LEVEL LT 7.0%: CPT | Performed by: INTERNAL MEDICINE

## 2023-10-11 PROCEDURE — 82947 ASSAY GLUCOSE BLOOD QUANT: CPT | Performed by: INTERNAL MEDICINE

## 2023-10-11 PROCEDURE — 83036 HEMOGLOBIN GLYCOSYLATED A1C: CPT | Performed by: INTERNAL MEDICINE

## 2023-10-11 RX ORDER — SEMAGLUTIDE 0.68 MG/ML
0.5 INJECTION, SOLUTION SUBCUTANEOUS WEEKLY
Qty: 2 ML | Refills: 5 | Status: SHIPPED | OUTPATIENT
Start: 2023-10-11

## 2023-10-11 RX ORDER — SEMAGLUTIDE 1.34 MG/ML
0.25 INJECTION, SOLUTION SUBCUTANEOUS WEEKLY
COMMUNITY
End: 2023-10-11

## 2023-10-11 RX ORDER — ANASTROZOLE 1 MG/1
1 TABLET ORAL WEEKLY
COMMUNITY
Start: 2023-10-03

## 2023-10-11 NOTE — PROGRESS NOTES
"Chief Complaint   Patient presents with    Diabetes        HPI   Tiago Whatley is a 58 y.o. male had concerns including Diabetes.      Patient reports that he started using hearing aids since last visit. He also was started on ozempic by his PCP. He did have diarrhea for the first week but this has since resolved.    Current diabetes medications include the following:  Ozempic 0.25 mg weekly  Combination alogliptin-metformin 12.5 -1000, 1 tablet twice daily. He is frequently taking this only once daily.   Jardiance 25 mg daily    Patient is taking atorvastatin 10 mg daily. Patient reports this was changed to 5 mg daily due to myalgias which have improved.     Patient reports eye exam was completed this spring.     The following portions of the patient's history were reviewed and updated as appropriate: allergies, current medications, and past social history.    Review of Systems   Gastrointestinal:  Negative for nausea and vomiting.      /90 (BP Location: Left arm, Patient Position: Sitting, Cuff Size: Adult)   Pulse 96   Ht 180.3 cm (71\")   Wt 117 kg (258 lb)   SpO2 98%   BMI 35.98 kg/mý      Physical Exam      Constitutional:  well developed; well nourished  no acute distress  obese - Body mass index is 35.98 kg/mý.   ENT/Thyroid: not examined   Eyes: Conjunctiva: clear   Respiratory:  breathing is unlabored  clear to auscultation bilaterally   Cardiovascular:  regular rate and rhythm   Chest:  Not performed.   Abdomen: Not performed.   : Not performed.   Musculoskeletal: Not performed   Skin: not performed.   Neuro: mental status, speech normal   Psych: mood and affect are within normal limits       Labs/Imaging   Latest Reference Range & Units 10/11/23 08:08   Glucose 70 - 130 mg/dL 145 !   Hemoglobin A1C 4.5 - 5.7 % 6.6 !   !: Data is abnormal     Latest Reference Range & Units 05/16/23 08:51   Sodium 136 - 145 mmol/L 137   Potassium 3.5 - 5.2 mmol/L 4.1   Chloride 98 - 107 mmol/L 102   CO2 22.0 " - 29.0 mmol/L 25.4   Anion Gap 5.0 - 15.0 mmol/L 9.6   BUN 6 - 20 mg/dL 15   Creatinine 0.76 - 1.27 mg/dL 0.82   BUN/Creatinine Ratio 7.0 - 25.0  18.3   eGFR >60.0 mL/min/1.73 101.2   Glucose 65 - 99 mg/dL 138 (H)   Calcium 8.6 - 10.5 mg/dL 9.7   Alkaline Phosphatase 39 - 117 U/L 68   Total Protein 6.0 - 8.5 g/dL 6.6   Albumin 3.5 - 5.2 g/dL 4.4   Globulin gm/dL 2.2   A/G Ratio g/dL 2.0   AST (SGOT) 1 - 40 U/L 20   ALT (SGPT) 1 - 41 U/L 30   Total Bilirubin 0.0 - 1.2 mg/dL 0.4   Total Cholesterol 0 - 200 mg/dL 162   HDL Cholesterol 40 - 60 mg/dL 34 (L)   LDL Cholesterol  0 - 100 mg/dL 98   VLDL Cholesterol 5 - 40 mg/dL 30   Triglycerides 0 - 150 mg/dL 171 (H)   LDL/HDL Ratio  2.76   Creatinine, Urine mg/dL 79.9   Microalbumin, Urine mg/dL 1.4   Microalbumin/Creatinine Ratio mg/g 17.5   (H): Data is abnormally high  (L): Data is abnormally low    Diagnoses and all orders for this visit:    1. Type 2 diabetes mellitus with hyperglycemia, without long-term current use of insulin (Primary)  -     POC Glucose, Blood  -     POC Glycosylated Hemoglobin (Hb A1C)  Hemoglobin A1c at goal, 6.6%.  Patient mildly hyperglycemic during office visit.  Discussed with patient that we do not typically utilize DPP 4 inhibitor and GLP-1 receptor agonist at the same time due to overlap in mechanism of action.  He does report that he is frequently missing at least 1 dose per day of combination alogliptin/metformin.  Discussed potential to increase dose of GLP-1 receptor agonist and potentially eliminate this medication.  Increase Ozempic to 0.5 mg weekly.  Patient will contact the clinic with any concerns for adverse effect.  Stop combination alogliptin/metformin  Continue Jardiance 25 mg daily  Screening labs are up-to-date from May 2023.  Patient reports eye exam is up-to-date within the past year    2. Hyperlipidemia, unspecified hyperlipidemia type  Patient will continue statin therapy.  Lipid panel is up-to-date.     Return in about  4 months (around 2/11/2024). The patient was instructed to contact the clinic with any interval questions or concerns.    Jo-Ann Perez MD   Endocrinologist    Dictated Utilizing Dragon Dictation

## 2024-01-23 ENCOUNTER — PRIOR AUTHORIZATION (OUTPATIENT)
Dept: ENDOCRINOLOGY | Facility: CLINIC | Age: 59
End: 2024-01-23
Payer: MEDICAID

## 2024-01-23 NOTE — TELEPHONE ENCOUNTER
The authorization is effective from 01/23/2024 to 01/22/2025, as long as you are enrolled as  a member of your current health plan.  The request was approved as submitted.

## 2025-01-13 ENCOUNTER — PRIOR AUTHORIZATION (OUTPATIENT)
Dept: ENDOCRINOLOGY | Facility: CLINIC | Age: 60
End: 2025-01-13

## 2025-01-13 NOTE — TELEPHONE ENCOUNTER
PA started on CMM for Ozempic (0.25 or 0.5 MG/DOSE) 2MG/3ML pen-injectors    Denied to do PA due to last appt 10-11-23